# Patient Record
Sex: MALE | Race: OTHER | NOT HISPANIC OR LATINO | URBAN - METROPOLITAN AREA
[De-identification: names, ages, dates, MRNs, and addresses within clinical notes are randomized per-mention and may not be internally consistent; named-entity substitution may affect disease eponyms.]

---

## 2018-08-05 ENCOUNTER — EMERGENCY (EMERGENCY)
Facility: HOSPITAL | Age: 28
LOS: 1 days | Discharge: HOME | End: 2018-08-05
Admitting: EMERGENCY MEDICINE
Payer: COMMERCIAL

## 2018-08-05 VITALS
TEMPERATURE: 99 F | HEART RATE: 76 BPM | SYSTOLIC BLOOD PRESSURE: 132 MMHG | RESPIRATION RATE: 16 BRPM | DIASTOLIC BLOOD PRESSURE: 91 MMHG | OXYGEN SATURATION: 100 %

## 2018-08-05 PROCEDURE — 99283 EMERGENCY DEPT VISIT LOW MDM: CPT

## 2018-08-05 PROCEDURE — 73120 X-RAY EXAM OF HAND: CPT | Mod: 26,LT

## 2018-08-05 RX ORDER — IBUPROFEN 200 MG
1 TABLET ORAL
Qty: 30 | Refills: 0 | OUTPATIENT
Start: 2018-08-05

## 2018-08-05 RX ORDER — OXYCODONE HYDROCHLORIDE 5 MG/1
1 TABLET ORAL
Qty: 12 | Refills: 0 | OUTPATIENT
Start: 2018-08-05

## 2018-08-05 RX ORDER — OXYCODONE AND ACETAMINOPHEN 5; 325 MG/1; MG/1
1 TABLET ORAL ONCE
Qty: 0 | Refills: 0 | Status: DISCONTINUED | OUTPATIENT
Start: 2018-08-05 | End: 2018-08-05

## 2018-08-05 RX ORDER — IBUPROFEN 200 MG
800 TABLET ORAL ONCE
Qty: 0 | Refills: 0 | Status: COMPLETED | OUTPATIENT
Start: 2018-08-05 | End: 2018-08-05

## 2018-08-05 RX ADMIN — Medication 800 MILLIGRAM(S): at 20:09

## 2018-08-05 RX ADMIN — OXYCODONE AND ACETAMINOPHEN 1 TABLET(S): 5; 325 TABLET ORAL at 20:09

## 2018-08-05 NOTE — ED PROVIDER NOTE - MEDICAL DECISION MAKING DETAILS
26 y/o male c/o left 2nd-5th digit injury s/p door crush injury  -r/o fractures  -xray, pain control  -possible hand eval (outpt vs er)
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
## 2018-08-05 NOTE — ED PROVIDER NOTE - MUSCULOSKELETAL MINIMAL EXAM
left hand: + mild erythema and swelling 2nd through 5th digits from tip to proximal aspect dip joint. + tenderness to palption. pain most prominent 3rd digit. no ecchymosis noted. no crepitus. no obvious defomrity seen. sensations intact. good cap refill to all fingers. unable to bend due to pain.  small superficial abrasion to volar aspect 3rd digit

## 2019-04-30 NOTE — ED ADULT TRIAGE NOTE - NS ED NOTE AC HIGH RISK COUNTRIES
How Many Skin Cancers Have You Had?: more than one What Is The Reason For Today's Visit?: History of Non-Melanoma Skin Cancer No

## 2020-03-26 ENCOUNTER — TRANSCRIPTION ENCOUNTER (OUTPATIENT)
Age: 30
End: 2020-03-26

## 2020-05-14 ENCOUNTER — TRANSCRIPTION ENCOUNTER (OUTPATIENT)
Age: 30
End: 2020-05-14

## 2022-02-23 PROBLEM — Z00.00 ENCOUNTER FOR PREVENTIVE HEALTH EXAMINATION: Status: ACTIVE | Noted: 2022-02-23

## 2022-04-19 ENCOUNTER — APPOINTMENT (OUTPATIENT)
Dept: UROLOGY | Facility: CLINIC | Age: 32
End: 2022-04-19
Payer: COMMERCIAL

## 2022-04-19 VITALS
DIASTOLIC BLOOD PRESSURE: 81 MMHG | HEART RATE: 48 BPM | SYSTOLIC BLOOD PRESSURE: 129 MMHG | TEMPERATURE: 98 F | WEIGHT: 170 LBS

## 2022-04-19 DIAGNOSIS — Z80.9 FAMILY HISTORY OF MALIGNANT NEOPLASM, UNSPECIFIED: ICD-10-CM

## 2022-04-19 DIAGNOSIS — Z78.9 OTHER SPECIFIED HEALTH STATUS: ICD-10-CM

## 2022-04-19 DIAGNOSIS — Z30.09 ENCOUNTER FOR OTHER GENERAL COUNSELING AND ADVICE ON CONTRACEPTION: ICD-10-CM

## 2022-04-19 PROCEDURE — 99204 OFFICE O/P NEW MOD 45 MIN: CPT

## 2022-04-19 RX ORDER — FINASTERIDE 1 MG/1
1 TABLET, FILM COATED ORAL
Refills: 0 | Status: ACTIVE | COMMUNITY

## 2022-04-20 PROBLEM — Z30.09 VASECTOMY EVALUATION: Status: ACTIVE | Noted: 2022-04-19

## 2022-04-20 NOTE — ASSESSMENT
[FreeTextEntry1] : I counseled the patient extensively today with regard to vasectomy. I discussed the potential risks and benefits of the procedure with the patient including the potential for bleeding and infection. I also discussed the fact that this procedure should be considered irreversible and if there is any question in the patient's mind, I have encouraged him to reconsider his decision to move forward with the procedure. I also discussed the fact that he may remain temporarily fertile for a period of up to 3 months after undergoing the procedure and that a post vasectomy semen analysis would be required to confirm the absence of sperm in the ejaculate.\par \par I discussed options of performing the procedure either under local anesthesia in the office versus under a more general IV sedation in the operating room. He has decided to move forward with the procedure under local anesthesia and I will schedule that at the earliest available time.  He would also like to take Valium prior to the procedure which has been prescribed.

## 2022-04-20 NOTE — HISTORY OF PRESENT ILLNESS
[FreeTextEntry1] : Shakir Silva presents to the office today.  He is a 31-year-old man who is here today regarding vasectomy evaluation.  He has 2 children with his 34-year-old wife.  They are certain that they do not wish to have any further children and are interested in elective sterilization.  \par \par The patient reports a prior history of a varicocele surgery when he was around 8 years old.  He describes remembering a large visible varicocele previously.  Since his surgery that has all resolved and he does not have any noticeable residual varicocele or any testicular discomfort.  He has no other history of scrotal or inguinal surgery and no scrotal trauma.\par

## 2022-07-07 ENCOUNTER — APPOINTMENT (OUTPATIENT)
Dept: UROLOGY | Facility: CLINIC | Age: 32
End: 2022-07-07

## 2022-07-12 ENCOUNTER — APPOINTMENT (OUTPATIENT)
Dept: UROLOGY | Facility: CLINIC | Age: 32
End: 2022-07-12

## 2022-07-12 ENCOUNTER — OUTPATIENT (OUTPATIENT)
Dept: OUTPATIENT SERVICES | Facility: HOSPITAL | Age: 32
LOS: 1 days | End: 2022-07-12
Payer: COMMERCIAL

## 2022-07-12 VITALS — HEART RATE: 73 BPM | DIASTOLIC BLOOD PRESSURE: 89 MMHG | SYSTOLIC BLOOD PRESSURE: 142 MMHG

## 2022-07-12 DIAGNOSIS — R35.0 FREQUENCY OF MICTURITION: ICD-10-CM

## 2022-07-12 DIAGNOSIS — Z30.2 ENCOUNTER FOR STERILIZATION: ICD-10-CM

## 2022-07-12 PROCEDURE — 55250 REMOVAL OF SPERM DUCT(S): CPT

## 2022-07-12 RX ORDER — DIAZEPAM 5 MG/1
5 TABLET ORAL
Qty: 1 | Refills: 0 | Status: DISCONTINUED | COMMUNITY
Start: 2022-04-19 | End: 2022-07-12

## 2022-07-22 ENCOUNTER — NON-APPOINTMENT (OUTPATIENT)
Age: 32
End: 2022-07-22

## 2022-07-22 DIAGNOSIS — Z30.2 ENCOUNTER FOR STERILIZATION: ICD-10-CM

## 2024-11-05 ENCOUNTER — TRANSCRIPTION ENCOUNTER (OUTPATIENT)
Age: 34
End: 2024-11-05

## 2024-11-05 ENCOUNTER — EMERGENCY (EMERGENCY)
Facility: HOSPITAL | Age: 34
LOS: 0 days | Discharge: HOME | End: 2024-11-05
Attending: STUDENT IN AN ORGANIZED HEALTH CARE EDUCATION/TRAINING PROGRAM
Payer: COMMERCIAL

## 2024-11-05 VITALS
DIASTOLIC BLOOD PRESSURE: 77 MMHG | RESPIRATION RATE: 18 BRPM | TEMPERATURE: 98 F | HEIGHT: 69 IN | OXYGEN SATURATION: 97 % | SYSTOLIC BLOOD PRESSURE: 117 MMHG | WEIGHT: 175.05 LBS | HEART RATE: 57 BPM

## 2024-11-05 VITALS
HEART RATE: 62 BPM | SYSTOLIC BLOOD PRESSURE: 120 MMHG | RESPIRATION RATE: 18 BRPM | OXYGEN SATURATION: 99 % | DIASTOLIC BLOOD PRESSURE: 85 MMHG | TEMPERATURE: 98 F
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
## 2024-11-05 DIAGNOSIS — M54.50 LOW BACK PAIN, UNSPECIFIED: ICD-10-CM

## 2024-11-05 PROCEDURE — 99284 EMERGENCY DEPT VISIT MOD MDM: CPT

## 2024-11-05 RX ORDER — IBUPROFEN 200 MG
1 TABLET ORAL
Qty: 15 | Refills: 0
Start: 2024-11-05 | End: 2024-11-09

## 2024-11-05 RX ORDER — LIDOCAINE HYDROCHLORIDE 40 MG/ML
2 SOLUTION TOPICAL ONCE
Refills: 0 | Status: COMPLETED | OUTPATIENT
Start: 2024-11-05 | End: 2024-11-05

## 2024-11-05 RX ORDER — ACETAMINOPHEN 500 MG
2 TABLET ORAL
Qty: 30 | Refills: 0
Start: 2024-11-05 | End: 2024-11-09

## 2024-11-05 RX ORDER — KETOROLAC TROMETHAMINE 30 MG/ML
30 INJECTION INTRAMUSCULAR; INTRAVENOUS ONCE
Refills: 0 | Status: DISCONTINUED | OUTPATIENT
Start: 2024-11-05 | End: 2024-11-05

## 2024-11-05 RX ORDER — LIDOCAINE HYDROCHLORIDE 40 MG/ML
1 SOLUTION TOPICAL
Qty: 2 | Refills: 0
Start: 2024-11-05 | End: 2024-11-09

## 2024-11-05 RX ORDER — METHOCARBAMOL 500 MG/1
2 TABLET ORAL
Qty: 24 | Refills: 0
Start: 2024-11-05 | End: 2024-11-08

## 2024-11-05 RX ADMIN — LIDOCAINE HYDROCHLORIDE 2 PATCH: 40 SOLUTION TOPICAL at 03:29

## 2024-11-05 RX ADMIN — KETOROLAC TROMETHAMINE 30 MILLIGRAM(S): 30 INJECTION INTRAMUSCULAR; INTRAVENOUS at 03:29
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
## 2024-11-05 NOTE — ED ADULT NURSE NOTE - NSFALLUNIVINTERV_ED_ALL_ED
Bed/Stretcher in lowest position, wheels locked, appropriate side rails in place/Call bell, personal items and telephone in reach/Instruct patient to call for assistance before getting out of bed/chair/stretcher/Non-slip footwear applied when patient is off stretcher/[place] to call system/Physically safe environment - no spills, clutter or unnecessary equipment/Purposeful proactive rounding/Room/bathroom lighting operational, light cord in reach

## 2024-11-05 NOTE — ED PROVIDER NOTE - HIV OFFER
----- Message from Vani Gonsales NP sent at 11/29/2022  8:24 AM CST -----  Formerly [facility] Nurses:  Please inform Jared Escobar:  the kidney and liver test results are normal.  Thank you,    Vani        Opt out
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
## 2024-11-05 NOTE — ED ADULT NURSE NOTE - CAS EDP DISCH TYPE
Health Maintenance Summary     Topic Due On Due Status Completed On    Diabetes Eye Exam Jul 19, 1961 Overdue     Glycohemoglobin A1C  (Diabetes Sugar)  Oct 27, 2018 Not Due Apr 27, 2018    GFR  (Kidney Function Test)  Jul 31, 2019 Not Due Jul 31, 2018    Diabetes Foot Exam  Jul 19, 1961 Overdue     Medicare Wellness Visit May 1, 2019 Not Due May 1, 2018    IMMUNIZATION - DTaP/Tdap/Td May 18, 2027 Not Due May 18, 2017    Immunization-Influenza Sep 1, 2018 Not Due Oct 10, 2017    Depression Screening May 1, 2019 Not Due May 1, 2018      Completed May 11, 2015          Patient is due for topics as listed above, he wishes to discuss with provider .             Home
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
## 2024-11-05 NOTE — ED ADULT NURSE NOTE - NS ED NURSE LEVEL OF CONSCIOUSNESS AFFECT
Calm Procedure (Limit To 20 Characters): brace Reason?: non-covered service Payment Option: Option 2: Don't Bill Medicare, patient responsible for payment. Patient cannot appeal Medicare if billed. Detail Level: Detailed Reason?: Additional Information
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
## 2024-11-05 NOTE — ED PROVIDER NOTE - PATIENT PORTAL LINK FT
You can access the FollowMyHealth Patient Portal offered by [facility] by registering at the following website: [email or web address]. By joining [employer]’s FollowMyHealth portal, you will also be able to view your health information using other applications (apps) compatible with our system.

## 2025-01-14 ENCOUNTER — APPOINTMENT (OUTPATIENT)
Dept: UROLOGY | Facility: CLINIC | Age: 35
End: 2025-01-14
Payer: COMMERCIAL

## 2025-01-14 ENCOUNTER — NON-APPOINTMENT (OUTPATIENT)
Age: 35
End: 2025-01-14

## 2025-01-14 VITALS — BODY MASS INDEX: 26.14 KG/M2 | HEIGHT: 69 IN

## 2025-01-14 VITALS
HEART RATE: 68 BPM | DIASTOLIC BLOOD PRESSURE: 84 MMHG | OXYGEN SATURATION: 97 % | WEIGHT: 177 LBS | SYSTOLIC BLOOD PRESSURE: 121 MMHG

## 2025-01-14 DIAGNOSIS — Z30.2 ENCOUNTER FOR STERILIZATION: ICD-10-CM

## 2025-01-14 PROCEDURE — G2211 COMPLEX E/M VISIT ADD ON: CPT | Mod: NC

## 2025-01-14 PROCEDURE — 99213 OFFICE O/P EST LOW 20 MIN: CPT
